# Patient Record
Sex: FEMALE | Race: WHITE | URBAN - METROPOLITAN AREA
[De-identification: names, ages, dates, MRNs, and addresses within clinical notes are randomized per-mention and may not be internally consistent; named-entity substitution may affect disease eponyms.]

---

## 2022-03-20 PROBLEM — G43.009 MIGRAINE WITHOUT AURA AND WITHOUT STATUS MIGRAINOSUS, NOT INTRACTABLE: Status: ACTIVE | Noted: 2021-11-18

## 2022-07-14 ENCOUNTER — TELEMEDICINE (OUTPATIENT)
Dept: NEUROLOGY | Age: 32
End: 2022-07-14

## 2022-07-14 ENCOUNTER — TELEPHONE (OUTPATIENT)
Dept: NEUROLOGY | Age: 32
End: 2022-07-14

## 2022-07-14 DIAGNOSIS — G43.709 CHRONIC MIGRAINE WITHOUT AURA WITHOUT STATUS MIGRAINOSUS, NOT INTRACTABLE: Primary | ICD-10-CM

## 2022-07-14 DIAGNOSIS — G37.9 DEMYELINATING DISEASE (HCC): ICD-10-CM

## 2022-07-14 PROCEDURE — 99214 OFFICE O/P EST MOD 30 MIN: CPT | Performed by: PSYCHIATRY & NEUROLOGY

## 2022-07-14 RX ORDER — SUMATRIPTAN 100 MG/1
100 TABLET, FILM COATED ORAL
Qty: 9 TABLET | Refills: 11 | Status: SHIPPED | OUTPATIENT
Start: 2022-07-14 | End: 2022-07-14

## 2022-07-14 RX ORDER — DEXTROAMPHETAMINE SACCHARATE, AMPHETAMINE ASPARTATE, DEXTROAMPHETAMINE SULFATE AND AMPHETAMINE SULFATE 2.5; 2.5; 2.5; 2.5 MG/1; MG/1; MG/1; MG/1
TABLET ORAL
Qty: 60 TABLET | Refills: 0 | Status: SHIPPED | OUTPATIENT
Start: 2022-07-14 | End: 2022-08-14

## 2022-07-14 RX ORDER — DEXTROAMPHETAMINE SACCHARATE, AMPHETAMINE ASPARTATE, DEXTROAMPHETAMINE SULFATE AND AMPHETAMINE SULFATE 2.5; 2.5; 2.5; 2.5 MG/1; MG/1; MG/1; MG/1
TABLET ORAL
Qty: 60 TABLET | Refills: 0 | Status: SHIPPED | OUTPATIENT
Start: 2022-09-14 | End: 2022-10-13 | Stop reason: SDUPTHER

## 2022-07-14 RX ORDER — DEXTROAMPHETAMINE SACCHARATE, AMPHETAMINE ASPARTATE, DEXTROAMPHETAMINE SULFATE AND AMPHETAMINE SULFATE 2.5; 2.5; 2.5; 2.5 MG/1; MG/1; MG/1; MG/1
TABLET ORAL
Qty: 60 TABLET | Refills: 0 | Status: SHIPPED | OUTPATIENT
Start: 2022-08-14 | End: 2022-09-14

## 2022-07-14 ASSESSMENT — ENCOUNTER SYMPTOMS
EYES NEGATIVE: 1
RESPIRATORY NEGATIVE: 1
GASTROINTESTINAL NEGATIVE: 1

## 2022-07-14 NOTE — PROGRESS NOTES
[address], [address]  [place], [address]  Phone: [phone] Fax [phone]  Dr. Randye Nyhan      I was at home While conducting this encounter. She and/ or her healthcare decision maker is aware that this patient-initialed Telehealth encounter is a billable service with coverage as determined by her insurance carrier. She is aware that she may receive a bill and has provided verbal consent to proceed: Yes    The Virtual visit was conducted via [address] Pursuant to the emergency declaration under the [facility] waiver authority and the [employer] Act, this Virtual  Visit was conducted to reduce the patient's risk of exposure to COVID-19 and provide continuity of care for an established patient. Services were provided through a video synchronous discussion virtually to substitute for in-person clinic visit. Due to this being a TeleHealth evaluation, many elements of the physical examination are unable to be assessed. Total Time:  minutes: 20-29 minutes        7/14/2022  Counts include 234 beds at the [facility]     Patient is referred by the following provider for consultation regarding as below:       I reviewed the available records and notes and have examined patient with the following findings:     Chief Complaint:  No chief complaint on file. HPI: This is a right handed 27 y.o.  female who unfortunately started having severe chronic debilitating headaches when she was in college. They progressively gotten worse they actually discontinued her birth control pills because they thought that might help and unfortunately she even got more worse. She has distinct triggers which include alcohol menstrual cycle whether 2 drinks of alcohol will cause and trigger headache.   She was getting about 14 to 15/month there photophobia phonophobia since change in smell nausea vomiting foot and blurred vision. I do concern for some the symptomatologies and central nervous system demyelinating disease we I did order an MRI of the brain but she was not contacted. She has been on Imitrex 50 mg we change it to the 100 mg which she likes better. She is tried in the past over-the-counter medication Excedrin Relpax Topamax and Ubrelvy and Nurtec. Imitrex 100 mg seems to work better than most.  We put her on Ajovy and Ajovy took her from 15 headaches a month to 4-5 headaches per month a dramatic improvement. We did start on the Doxy system but had to change over the telephone system due to break-up technical difficulties on her screen. On last evaluation she did complain of ADD-like symptoms difficulty focusing and concentrating easily distracted and very difficult getting back at task there is though interfering with her job. It is just very difficult to get back on task when she is distracted away. When she is on a task she is easily frustrated by things by difficulty staying on task. She has never had any central nervous system stimulants in the past.  She has no palpitation shortness of breath wheezing coughing. IMAGING REVIEW:  I REVIEWED PERTINENT  IMAGES AND REPORTS WITH THE PATIENT PERSONALLY, DIRECTLY AND FULLY. Past Medical History:  No past medical history on file. Past Surgical History:  No past surgical history on file.     Social History:  Social History     Socioeconomic History    Marital status: Single     Spouse name: Not on file    Number of children: Not on file    Years of education: Not on file    Highest education level: Not on file   Occupational History    Not on file   Tobacco Use    Smoking status: Not on file    Smokeless tobacco: Not on file   Substance and Sexual Activity    Alcohol use: Not on file    Drug use: Not on file    Sexual activity: Not on file   Other Topics Concern    Not on file   Social History Narrative    Not on file     Social Systems:  Review of Systems   Constitutional: Negative. HENT: Negative. Eyes: Negative. Respiratory: Negative. Cardiovascular: Negative. Gastrointestinal: Negative. Endocrine: Negative. Genitourinary: Negative. Musculoskeletal: Negative. Skin: Negative. Neurological: Positive for headaches. No flowsheet data found. No flowsheet data found. There were no vitals filed for this visit. Physical Exam  Constitutional:       Appearance: Normal appearance. HENT:      Head: Normocephalic and atraumatic. Eyes:      Extraocular Movements: Extraocular movements intact and EOM normal.   Neurological:      Mental Status: She is alert and oriented to person, place, and time. Neurologic Exam     Mental Status   Oriented to person, place, and time. Attention: normal. Concentration: normal.   Level of consciousness: alert  Knowledge: good. Her speech is clear and concise. Cranial Nerves     CN II   Visual fields full to confrontation. CN III, IV, VI   Extraocular motions are normal.     CN VII   Facial expression full, symmetric. Motor Exam   Right arm tone: normal  Left arm tone: normal          Assessment   Assessment / Plan:    Diagnoses and all orders for this visit:    Chronic migraine without aura without status migrainosus, not intractable her headaches went from having about 15/month to 4 to 5/month using Ajovy which is fantastic. Imitrex 100 mg worked up breaking the headaches Nurtec did not work as well. She was never contacted for the MRI of the brain and the MRI of the brain is more for demyelinating disease due to some of her symptomatology. At this time due to her ADD-like symptoms were to move forward using Adderall 10 mg 1 the morning and 1 midday I gave her 3 prescriptions. It is not a memory issue is definitely a focusing and concentration issue. This is another reason to be doing an MRI.   Its not uncommon for this to show up with MS.    Demyelinating disease ([facility] [address] this was a strong concern on last evaluation we proceeded with an MRI of the brain unfortunately she was never contacted. -     MRI BRAIN W WO CONTRAST; Future  -     amphetamine-dextroamphetamine (ADDERALL, 10MG,) 10 MG tablet; Take one in AM and noon  -     amphetamine-dextroamphetamine (ADDERALL, 10MG,) 10 MG tablet; Take one in AM and noon  -     amphetamine-dextroamphetamine (ADDERALL, 10MG,) 10 MG tablet; Take one in AM and noon  -     MRI BRAIN W WO CONTRAST; Future    Other orders  -     SUMAtriptan (IMITREX) 100 MG tablet; Take 1 tablet by mouth once as needed for Migraine        The Diagnosis and differential diagnostic considerations, and Rx Tx were reviewed with the patient at length. Orders Placed This Encounter   Procedures    MRI BRAIN W WO CONTRAST     Standing Status:   Future     Standing Expiration Date:   7/14/2023     Order Specific Question:   STAT Creatinine as needed:     Answer: Yes    MRI BRAIN W WO CONTRAST     Standing Status:   Future     Standing Expiration Date:   7/14/2023     Order Specific Question:   STAT Creatinine as needed:     Answer:   Yes          I have spent greater than 50% of visit discussing and counseling of patient  for treatment and diagnostic plan review. Total time 30 min     . Notes: Patient is to continue all medications as directed by prescribing physicians. Continuations on today's visit are made based on the patient's report of current medications.              Dr. Keri Pollack  Consultation Neurology, Neurodiagnostics and Neurotherapeutics  Neuroelectrophysiology, EEG, EMG  [facility]  [address] [place] [address], [address] [facility]  Phone:  [phone]  Fax:   [phone]

## 2022-08-10 ENCOUNTER — TELEPHONE (OUTPATIENT)
Dept: NEUROLOGY | Age: 32
End: 2022-08-10

## 2022-08-10 NOTE — TELEPHONE ENCOUNTER
Patient left message requesting refill of Adderall. Patient also requests increase in dose as discussed during her virtual visit on 7/14/22. Pharmacy up to date in chart. Per chart review . . patient has adderall 10mg refills for 8/14 and 9/14.

## 2022-08-11 NOTE — TELEPHONE ENCOUNTER
She has 2 more prescriptions so I am not going to refill it. She has 1 for this month and next month. We will not increase it until we see her in follow-up appointment. Let her know please I am not sure if he got the previous message I just sent since the computer is acting up. It will not show me what I sent.

## 2022-08-11 NOTE — TELEPHONE ENCOUNTER
She has 2 more prescriptions on not can refill it. And we will not increase it until I see her in follow-up appointment.  Please let her know

## 2022-08-12 NOTE — TELEPHONE ENCOUNTER
Call back received form patient and informed of Dr. Gonzalo Cowan response. Patient voiced understanding and agreement. Closing encounter.

## 2022-10-13 ENCOUNTER — TELEPHONE (OUTPATIENT)
Dept: NEUROLOGY | Age: 32
End: 2022-10-13

## 2022-10-13 DIAGNOSIS — G37.9 DEMYELINATING DISEASE (HCC): ICD-10-CM

## 2022-10-13 RX ORDER — DEXTROAMPHETAMINE SACCHARATE, AMPHETAMINE ASPARTATE, DEXTROAMPHETAMINE SULFATE AND AMPHETAMINE SULFATE 2.5; 2.5; 2.5; 2.5 MG/1; MG/1; MG/1; MG/1
TABLET ORAL
Qty: 60 TABLET | Refills: 0 | Status: SHIPPED | OUTPATIENT
Start: 2022-10-13 | End: 2022-11-12

## 2022-10-13 RX ORDER — FREMANEZUMAB-VFRM 225 MG/1.5ML
INJECTION SUBCUTANEOUS
Qty: 3 ADJUSTABLE DOSE PRE-FILLED PEN SYRINGE | Refills: 3 | Status: SHIPPED | OUTPATIENT
Start: 2022-10-13

## 2022-10-13 NOTE — TELEPHONE ENCOUNTER
Patient called to request refill of Ajovy and Adderall. Last seen:7/14/22  F/U: 11/8/22    Pharmacy up to date.

## 2022-11-08 ENCOUNTER — TELEMEDICINE (OUTPATIENT)
Dept: NEUROLOGY | Age: 32
End: 2022-11-08

## 2022-11-08 DIAGNOSIS — G37.9 DEMYELINATING DISEASE (HCC): ICD-10-CM

## 2022-11-08 DIAGNOSIS — G43.709 CHRONIC MIGRAINE WITHOUT AURA WITHOUT STATUS MIGRAINOSUS, NOT INTRACTABLE: Primary | ICD-10-CM

## 2022-11-08 DIAGNOSIS — F98.8 ATTENTION DEFICIT DISORDER, UNSPECIFIED HYPERACTIVITY PRESENCE: ICD-10-CM

## 2022-11-08 PROCEDURE — 99214 OFFICE O/P EST MOD 30 MIN: CPT | Performed by: PSYCHIATRY & NEUROLOGY

## 2022-11-08 RX ORDER — SUMATRIPTAN 100 MG/1
100 TABLET, FILM COATED ORAL
Qty: 9 TABLET | Refills: 11 | Status: SHIPPED | OUTPATIENT
Start: 2022-11-08 | End: 2022-11-08

## 2022-11-08 RX ORDER — FREMANEZUMAB-VFRM 225 MG/1.5ML
INJECTION SUBCUTANEOUS
Qty: 1 ADJUSTABLE DOSE PRE-FILLED PEN SYRINGE | Refills: 11 | Status: SHIPPED | OUTPATIENT
Start: 2022-11-08

## 2022-11-08 ASSESSMENT — ENCOUNTER SYMPTOMS
ALLERGIC/IMMUNOLOGIC NEGATIVE: 1
RESPIRATORY NEGATIVE: 1
GASTROINTESTINAL NEGATIVE: 1

## 2022-11-08 NOTE — PROGRESS NOTES
[address], [address]  [place], [address]  Phone: [phone] Fax [phone]  Dr. Laine Lozoya was in the office While conducting this encounter. She and/ or her healthcare decision maker is aware that this patient-initialed Telehealth encounter is a billable service with coverage as determined by her insurance carrier. She is aware that she may receive a bill and has provided verbal consent to proceed: Yes    The Virtual visit was conducted via [employer]. Pursuant to the emergency declaration under the [facility] waiver authority and the [employer] Act, this Virtual  Visit was conducted to reduce the patient's risk of exposure to COVID-19 and provide continuity of care for an established patient. Services were provided through a video synchronous discussion virtually to substitute for in-person clinic visit. Due to this being a TeleHealth evaluation, many elements of the physical examination are unable to be assessed. Total Time:  minutes: 30-39 minutes        11/8/2022  [place]     Patient is referred by the following provider for consultation regarding as below:       I reviewed the available records and notes and have examined patient with the following findings:     Chief Complaint:  No chief complaint on file. HPI: This is a right handed 27 y.o.  female who is very pleasant very appropriate patient who unfortunately started having severe chronic migraine headaches this started when she was in college. They progressively gotten worse. That she actually discontinued her birth control to try birth control pills to see if it would help. She already knows that her menstrual cycle and alcohol definitely trigger her headaches which is very frustrating to her.   We did have a lengthy talk and she is going to try organic wine and see if the elimination of sulfates helps with the headaches. She was getting up to 15/month with severe photophobia phonophobia nausea and vomiting. We had set her up for an MRI because we are concerned about the paresthesias which she gets both during the migraines and not as well as visual changes that she gets during the migraine headaches as well as 9 with the headaches. But the MRIs not been done yet. On top of this she gets paresthesias associated with the headaches occasionally. We put her on Ajovy and she went from 15 a month to 4 to 5/month which was a dramatic improvement. But now they have creeped back up to about 2 a week which is an increase. And were not sure if it is because we added Adderall 10 mg twice a day. She does have ADD-like symptoms has all her life focusing concentrating was very difficult easily distracted and difficulties getting back on task. The Adderall definitely helped her. With these problems but were not sure but it made her easily agitated easily frustrated as well she has had an increase in headaches and I am not sure if it is from the Adderall itself. IMAGING REVIEW:  I REVIEWED PERTINENT  IMAGES AND REPORTS WITH THE PATIENT PERSONALLY, DIRECTLY AND FULLY. Past Medical History:  No past medical history on file. Past Surgical History:  No past surgical history on file.     Social History:  Social History     Socioeconomic History    Marital status: Single     Spouse name: Not on file    Number of children: Not on file    Years of education: Not on file    Highest education level: Not on file   Occupational History    Not on file   Tobacco Use    Smoking status: Not on file    Smokeless tobacco: Not on file   Substance and Sexual Activity    Alcohol use: Not on file    Drug use: Not on file    Sexual activity: Not on file   Other Topics Concern    Not on file   Social History Narrative    Not on file     Social Determinants of Health     Financial Resource Strain: Not on file   Food Insecurity: Not on file   Transportation Needs: Not on file   Physical Activity: Not on file   Stress: Not on file   Social Connections: Not on file   Intimate Partner Violence: Not on file   Housing Stability: Not on file       Family History:   No family history on file. Current Outpatient Medications on File Prior to Visit   Medication Sig Dispense Refill    amphetamine-dextroamphetamine (ADDERALL, 10MG,) 10 MG tablet Take one in AM and noon 60 tablet 0    amphetamine-dextroamphetamine (ADDERALL, 10MG,) 10 MG tablet Take one in AM and noon 60 tablet 0    amphetamine-dextroamphetamine (ADDERALL, 10MG,) 10 MG tablet Take one in AM and noon 60 tablet 0    MAGNESIUM PO Take by mouth      eletriptan (RELPAX) 40 MG tablet [email or web address] of migraine, repeat in 2 hrs prn. Max 2 tabs/24 hrs. Do not use > 3 days/week. fluticasone (FLONASE) 50 MCG/ACT nasal spray 2 sprays by Nasal route daily      topiramate (TOPAMAX) 25 MG tablet TAKE 1 TAB BY MOUTH AT BEDTIME. MAY TAKE 1 TAB TWICE DAILY IN 3 TO 4 WEEKS AS NEEDED       No current facility-administered medications on file prior to visit. No Known Allergies    Review of Systems:  Review of Systems   Constitutional: Negative. HENT: Negative. Eyes:  Positive for visual disturbance. Respiratory: Negative. Cardiovascular: Negative. Gastrointestinal: Negative. Endocrine: Negative. Genitourinary: Negative. Musculoskeletal: Negative. Allergic/Immunologic: Negative. Neurological:  Positive for numbness and headaches. Hematological: Negative. Psychiatric/Behavioral: Negative. No flowsheet data found. No flowsheet data found. There were no vitals filed for this visit. Physical Exam  Constitutional:       Appearance: Normal appearance. HENT:      Head: Normocephalic and atraumatic. Eyes:      Extraocular Movements: Extraocular movements intact and EOM normal.   Abdominal:      General: Abdomen is flat. Neurological:      Mental Status: She is alert and oriented to person, place, and time. Neurologic Exam     Mental Status   Oriented to person, place, and time. Attention: normal. Concentration: normal.   Level of consciousness: alert  Knowledge: good. Speech is clear and concise     Cranial Nerves     CN III, IV, VI   Extraocular motions are normal.     CN VII   Facial expression full, symmetric. Motor Exam   Right arm tone: normal  Left arm tone: normal        Assessment   Assessment / Plan:    Diagnoses and all orders for this visit:    Chronic migraine without aura without status migrainosus, not intractable at this at this time the Ajovy is actually working. It took her from about 15 a month down to 4 to 5/month but now she is back up to about 2 a week which is a little bit of an increase. And were not sure if it is because of the Adderall. So she is going to hold the Adderall to see if her mood improves as well as the headaches improve and if they do we may consider trying Ritalin or Concerta. And she will let us know. Demyelinating disease ([facility] [address] due to the mood swings and paresthesias that occur and visual changes we need to move forward with an MRI of the patient's brain. We have been trying to set this MRI but unfortunately she just does not get called. -     MRI BRAIN W WO CONTRAST; Future    Attention deficit disorder, unspecified hyperactivity presence there is no question the Adderall helped her stay focused and productive but it did change her mood to more of a \"sad\". As well as made her extremely easily frustrated at least we think so. I will  hold the Adderall and if she does better we will get a changer to again either Ritalin or Concerta. She will send me a message or call. Other orders  -     Fremanezumab-vfrm (AJOVY) 225 MG/1.5ML SOAJ; Take one q month  -     SUMAtriptan (IMITREX) 100 MG tablet;  Take 1 tablet by mouth once as needed for Migraine      The Diagnosis and differential diagnostic considerations, and Rx Tx were reviewed with the patient at length. Orders Placed This Encounter   Procedures    MRI BRAIN W WO CONTRAST     Standing Status:   Future     Standing Expiration Date:   11/8/2023     Order Specific Question:   STAT Creatinine as needed:     Answer:   Yes          I have spent greater than 50% of visit discussing and counseling of patient  for treatment and diagnostic plan review. Total time30 min     . Notes: Patient is to continue all medications as directed by prescribing physicians. Continuations on today's visit are made based on the patient's report of current medications.              Dr. Aleyda Ramirez  Consultation Neurology, Neurodiagnostics and Neurotherapeutics  Neuroelectrophysiology, EEG, EMG  [facility]  [address] [place] [address], [address] [facility]  Phone:  [phone]  Fax:   [phone]

## 2022-11-16 ENCOUNTER — TELEPHONE (OUTPATIENT)
Dept: NEUROLOGY | Age: 32
End: 2022-11-16

## 2022-11-17 DIAGNOSIS — F98.8 ATTENTION DEFICIT DISORDER, UNSPECIFIED HYPERACTIVITY PRESENCE: Primary | ICD-10-CM

## 2022-11-17 RX ORDER — METHYLPHENIDATE HYDROCHLORIDE 10 MG/1
TABLET ORAL
Qty: 60 TABLET | Refills: 0 | Status: SHIPPED | OUTPATIENT
Start: 2022-11-17 | End: 2022-12-17

## 2022-11-17 NOTE — PROGRESS NOTES
She really wanted to try Ritalin I put her on 10 mg 1 the morning 1 midday and we will see how she does.

## 2022-12-13 ENCOUNTER — TELEPHONE (OUTPATIENT)
Dept: NEUROLOGY | Age: 32
End: 2022-12-13

## 2022-12-13 NOTE — TELEPHONE ENCOUNTER
----- Message from Yasmin Oakley DO sent at 11/8/2022 12:41 PM EST -----  Regarding: fu  She will need a fu apt in 4-5 mths

## 2023-01-23 ENCOUNTER — OFFICE VISIT (OUTPATIENT)
Dept: NEUROLOGY | Age: 33
End: 2023-01-23
Payer: COMMERCIAL

## 2023-01-23 ENCOUNTER — PATIENT MESSAGE (OUTPATIENT)
Dept: NEUROLOGY | Age: 33
End: 2023-01-23

## 2023-01-23 VITALS
SYSTOLIC BLOOD PRESSURE: 124 MMHG | BODY MASS INDEX: 23.8 KG/M2 | DIASTOLIC BLOOD PRESSURE: 83 MMHG | WEIGHT: 170 LBS | HEIGHT: 71 IN

## 2023-01-23 DIAGNOSIS — F98.8 ATTENTION DEFICIT DISORDER, UNSPECIFIED HYPERACTIVITY PRESENCE: ICD-10-CM

## 2023-01-23 DIAGNOSIS — F98.8 ATTENTION DEFICIT DISORDER, UNSPECIFIED HYPERACTIVITY PRESENCE: Primary | ICD-10-CM

## 2023-01-23 DIAGNOSIS — G43.709 CHRONIC MIGRAINE WITHOUT AURA WITHOUT STATUS MIGRAINOSUS, NOT INTRACTABLE: ICD-10-CM

## 2023-01-23 PROCEDURE — 99214 OFFICE O/P EST MOD 30 MIN: CPT | Performed by: PSYCHIATRY & NEUROLOGY

## 2023-01-23 RX ORDER — METHYLPHENIDATE HYDROCHLORIDE 20 MG/1
TABLET ORAL
Qty: 60 TABLET | Refills: 0 | Status: SHIPPED | OUTPATIENT
Start: 2023-04-21 | End: 2023-01-25 | Stop reason: SDUPTHER

## 2023-01-23 RX ORDER — METHYLPHENIDATE HYDROCHLORIDE 20 MG/1
TABLET ORAL
Qty: 60 TABLET | Refills: 0 | Status: SHIPPED | OUTPATIENT
Start: 2023-01-23 | End: 2023-01-25

## 2023-01-23 RX ORDER — METHYLPHENIDATE HYDROCHLORIDE 20 MG/1
TABLET ORAL
Qty: 60 TABLET | Refills: 0 | Status: SHIPPED | OUTPATIENT
Start: 2023-02-22 | End: 2023-03-22

## 2023-01-23 RX ORDER — FREMANEZUMAB-VFRM 225 MG/1.5ML
INJECTION SUBCUTANEOUS
Qty: 1 ADJUSTABLE DOSE PRE-FILLED PEN SYRINGE | Refills: 11 | Status: SHIPPED | OUTPATIENT
Start: 2023-01-23

## 2023-01-23 RX ORDER — SUMATRIPTAN 100 MG/1
100 TABLET, FILM COATED ORAL
Qty: 9 TABLET | Refills: 11 | Status: SHIPPED | OUTPATIENT
Start: 2023-01-23 | End: 2023-01-23

## 2023-01-23 ASSESSMENT — ENCOUNTER SYMPTOMS
EYES NEGATIVE: 1
ALLERGIC/IMMUNOLOGIC NEGATIVE: 1
RESPIRATORY NEGATIVE: 1

## 2023-01-23 NOTE — PROGRESS NOTES
[address], [address] [place] [address], [address] [place] [address]  Phone: [phone] Fax [phone]  Dr. Su Campuzano      1/23/2023  Primus Pu     Patient is referred by the following provider for consultation regarding as below:       I reviewed the available records and notes and have examined patient with the following findings:     Chief Complaint:  Chief Complaint   Patient presents with    Migraine          HPI: This is a right handed 34 y.o. Single female who is very pleasant very appropriate patient unfortunately suffers from chronic migraine headaches that started when she was in college. She discontinued her birth control pills but there was no change to her headaches. She definitely knows one of her triggers as well nonorganic wine were not sure about organic wine yet. Along with her menstrual cycles are definite triggers if she has 3 glasses of wine and she is going to have a migraine headache. She typically was getting about 15/month without that is her baseline. Severe pressure severe photophobia nausea phonophobia and vomiting associated with. There is visual changes paresthetic sensations and paresthesias throughout her body and throughout her head when she gets these. We have been trying to get an MRI of her brain for central nervous system demyelinating disease because of these paresthesias that come. Both with and without her headaches. Giacomodamionflavio took her from 13 a month down to 4-month. And Imitrex 100 mg works remarkably well. She she does occasionally get visual changes with or without her headaches which also makes me concerned for demyelinating disease. She has ADD-like symptoms. Focus and concentration easily distractible difficulties getting back on task Adderall did cause a lot of mood changes so we tried her on Ritalin 10 mg 1 in the morning 1 midday which is better than the Adderall in regards to the mood but she needs a higher dose.   Her  aware program was looked into it as well. She is already tried over-the-counter medications Relpax Topamax Imitrex Excedrin Ubrelvy Nurtec for her headaches. IMAGING REVIEW:  I REVIEWED PERTINENT  IMAGES AND REPORTS WITH THE PATIENT PERSONALLY, DIRECTLY AND FULLY. Past Medical History:  No past medical history on file. Past Surgical History:  No past surgical history on file. Social History:  Social History     Socioeconomic History    Marital status: Single     Spouse name: Not on file    Number of children: Not on file    Years of education: Not on file    Highest education level: Not on file   Occupational History    Not on file   Tobacco Use    Smoking status: Former     Types: Cigarettes    Smokeless tobacco: Never   Substance and Sexual Activity    Alcohol use: Not on file    Drug use: Not on file    Sexual activity: Not on file   Other Topics Concern    Not on file   Social History Narrative    Not on file     Social Determinants of Health     Financial Resource Strain: Not on file   Food Insecurity: Not on file   Transportation Needs: Not on file   Physical Activity: Not on file   Stress: Not on file   Social Connections: Not on file   Intimate Partner Violence: Not on file   Housing Stability: Not on file       Family History:   No family history on file. Current Outpatient Medications on File Prior to Visit   Medication Sig Dispense Refill    MAGNESIUM PO Take by mouth prn      amphetamine-dextroamphetamine (ADDERALL, 10MG,) 10 MG tablet Take one in AM and noon 60 tablet 0    amphetamine-dextroamphetamine (ADDERALL, 10MG,) 10 MG tablet Take one in AM and noon 60 tablet 0    amphetamine-dextroamphetamine (ADDERALL, 10MG,) 10 MG tablet Take one in AM and noon 60 tablet 0    eletriptan (RELPAX) 40 MG tablet [email or web address] of migraine, repeat in 2 hrs prn. Max 2 tabs/24 hrs. Do not use > 3 days/week.  (Patient not taking: Reported on 1/23/2023)      fluticasone (FLONASE) 50 MCG/ACT nasal spray 2 sprays by Nasal route daily      topiramate (TOPAMAX) 25 MG tablet TAKE 1 TAB BY MOUTH AT BEDTIME. MAY TAKE 1 TAB TWICE DAILY IN 3 TO 4 WEEKS AS NEEDED (Patient not taking: Reported on 1/23/2023)       No current facility-administered medications on file prior to visit. No Known Allergies    Review of Systems:  Review of Systems   Constitutional: Negative. HENT: Negative. Eyes: Negative. Respiratory: Negative. Cardiovascular: Negative. Endocrine: Negative. Genitourinary: Negative. Allergic/Immunologic: Negative. Neurological:  Positive for numbness and headaches. Hematological: Negative. No flowsheet data found. No flowsheet data found. Vitals:    01/23/23 1223   BP: 124/83   Weight: 170 lb (77.1 kg)   Height: 5' 11\" (1.803 m)        Physical Exam  Constitutional:       Appearance: Normal appearance. HENT:      Head: Normocephalic and atraumatic. Eyes:      Extraocular Movements: Extraocular movements intact and EOM normal.      Pupils: Pupils are equal, round, and reactive to light. Cardiovascular:      Rate and Rhythm: Normal rate and regular rhythm. Pulses: Normal pulses. Pulmonary:      Effort: Pulmonary effort is normal.   Abdominal:      General: Abdomen is flat. Neurological:      Mental Status: She is alert and oriented to person, place, and time. Gait: Gait is intact. Deep Tendon Reflexes:      Reflex Scores:       Tricep reflexes are 2+ on the right side and 2+ on the left side. Bicep reflexes are 2+ on the right side and 2+ on the left side. Brachioradialis reflexes are 2+ on the right side and 2+ on the left side. Patellar reflexes are 2+ on the right side and 2+ on the left side. Achilles reflexes are 2+ on the right side and 2+ on the left side. Neurologic Exam     Mental Status   Oriented to person, place, and time. Attention: normal. Concentration: normal.   Level of consciousness: alert  Knowledge: good. Cranial Nerves     CN II   Visual fields full to confrontation. CN III, IV, VI   Pupils are equal, round, and reactive to light. Extraocular motions are normal.     CN VII   Facial expression full, symmetric. Motor Exam   Right arm tone: normal  Left arm tone: normal  Right leg tone: normal  Left leg tone: normal    Gait, Coordination, and Reflexes     Gait  Gait: normal    Tremor   Resting tremor: absent  Intention tremor: absent  Action tremor: absent    Reflexes   Right brachioradialis: 2+  Left brachioradialis: 2+  Right biceps: 2+  Left biceps: 2+  Right triceps: 2+  Left triceps: 2+  Right patellar: 2+  Left patellar: 2+  Right achilles: 2+  Left achilles: 2+        Assessment   Assessment / Plan:    Vimal Phillips was seen today for migraine. Diagnoses and all orders for this visit:    Attention deficit disorder, unspecified hyperactivity presence the Ritalin 10 mg 1 morning 1 midday is good but it does drop off rather quickly and she would like to try higher dose. The Ritalin causes less moodiness then Adderall at this time. -     methylphenidate (RITALIN) 20 MG tablet; Take one in Am and noon  -     methylphenidate (RITALIN) 20 MG tablet; Take one in AM and noon  -     methylphenidate (RITALIN) 20 MG tablet; Take one in AM and noon    Chronic migraine without aura without status migrainosus, not intractable she does extremely well with Ajovy along with Imitrex 100 mg tablets she is down to 3 maybe 4 headaches a month which the Imitrex works great for her. She does not want to change it at this time she is tried a multitude of other medications as mentioned above. Other orders  -     SUMAtriptan (IMITREX) 100 MG tablet; Take 1 tablet by mouth once as needed for Migraine  -     Fremanezumab-vfrm (AJOVY) 225 MG/1.5ML SOAJ; Take one q month        The Diagnosis and differential diagnostic considerations, and Rx Tx were reviewed with the patient at length.            No orders of the defined types were placed in this encounter. I have spent greater than 50% of visit discussing and counseling of patient  for treatment and diagnostic plan review. Total time30 min     . Notes: Patient is to continue all medications as directed by prescribing physicians. Continuations on today's visit are made based on the patient's report of current medications.              Dr. Reyes Landin  Consultation Neurology, Neurodiagnostics and Neurotherapeutics  Neuroelectrophysiology, EEG, EMG  [facility]  [address] [place] [address], [address] [place] [address]  Phone:  [phone]  Fax:   [phone]

## 2023-01-25 RX ORDER — METHYLPHENIDATE HYDROCHLORIDE 20 MG/1
TABLET ORAL
Qty: 60 TABLET | Refills: 0 | Status: SHIPPED | OUTPATIENT
Start: 2023-04-21 | End: 2023-05-22

## 2023-01-25 NOTE — TELEPHONE ENCOUNTER
Patient would like medication sent to [facility]. Medication pended with correct pharmacy attached.

## 2023-03-01 DIAGNOSIS — G37.9 DEMYELINATING DISEASE (HCC): ICD-10-CM

## 2023-03-01 RX ORDER — DEXTROAMPHETAMINE SACCHARATE, AMPHETAMINE ASPARTATE, DEXTROAMPHETAMINE SULFATE AND AMPHETAMINE SULFATE 2.5; 2.5; 2.5; 2.5 MG/1; MG/1; MG/1; MG/1
TABLET ORAL
Qty: 60 TABLET | Refills: 0 | Status: SHIPPED | OUTPATIENT
Start: 2023-03-01 | End: 2023-06-26

## 2023-03-01 RX ORDER — DEXTROAMPHETAMINE SACCHARATE, AMPHETAMINE ASPARTATE, DEXTROAMPHETAMINE SULFATE AND AMPHETAMINE SULFATE 2.5; 2.5; 2.5; 2.5 MG/1; MG/1; MG/1; MG/1
TABLET ORAL
Qty: 60 TABLET | Refills: 0 | Status: SHIPPED | OUTPATIENT
Start: 2023-03-01 | End: 2023-03-31

## 2023-03-01 NOTE — TELEPHONE ENCOUNTER
----- Message from Brad Vidal sent at 3/1/2023  9:40 AM EST -----  Regarding: Ritalin Refill  Staff,    I had my Ritalin refilled after my last visit with Dr. Suleiman Frank and it is supposed to refill every 15th of the month. Right now, I see my next refill is April. Did something change? Is it possible to have the refills automatically done every 15th like we were doing before please? Please advise, thank you in advance.      Lino Price

## 2023-05-02 DIAGNOSIS — F98.8 ATTENTION DEFICIT DISORDER, UNSPECIFIED HYPERACTIVITY PRESENCE: ICD-10-CM

## 2023-05-03 RX ORDER — METHYLPHENIDATE HYDROCHLORIDE 20 MG/1
TABLET ORAL
Qty: 60 TABLET | Refills: 0 | Status: SHIPPED | OUTPATIENT
Start: 2023-05-03 | End: 2023-06-02

## 2023-05-17 RX ORDER — FREMANEZUMAB-VFRM 225 MG/1.5ML
INJECTION SUBCUTANEOUS
Qty: 1 ADJUSTABLE DOSE PRE-FILLED PEN SYRINGE | Refills: 11 | Status: SHIPPED | OUTPATIENT
Start: 2023-05-17

## 2023-06-20 DIAGNOSIS — F98.8 ATTENTION DEFICIT DISORDER, UNSPECIFIED HYPERACTIVITY PRESENCE: Primary | ICD-10-CM

## 2023-06-20 RX ORDER — METHYLPHENIDATE HYDROCHLORIDE 20 MG/1
TABLET ORAL
Qty: 60 TABLET | Refills: 0 | Status: SHIPPED | OUTPATIENT
Start: 2023-06-20 | End: 2023-07-20

## 2023-07-17 RX ORDER — SUMATRIPTAN 100 MG/1
TABLET, FILM COATED ORAL
Qty: 9 TABLET | Refills: 11 | Status: SHIPPED | OUTPATIENT
Start: 2023-07-17

## 2023-07-17 RX ORDER — FREMANEZUMAB-VFRM 225 MG/1.5ML
INJECTION SUBCUTANEOUS
Qty: 1 ADJUSTABLE DOSE PRE-FILLED PEN SYRINGE | Refills: 11 | Status: SHIPPED | OUTPATIENT
Start: 2023-07-17

## 2023-07-24 ENCOUNTER — OFFICE VISIT (OUTPATIENT)
Dept: NEUROLOGY | Age: 33
End: 2023-07-24
Payer: COMMERCIAL

## 2023-07-24 DIAGNOSIS — S16.1XXD STRAIN OF NECK MUSCLE, SUBSEQUENT ENCOUNTER: ICD-10-CM

## 2023-07-24 DIAGNOSIS — G43.709 CHRONIC MIGRAINE WITHOUT AURA WITHOUT STATUS MIGRAINOSUS, NOT INTRACTABLE: Primary | ICD-10-CM

## 2023-07-24 DIAGNOSIS — F98.8 ATTENTION DEFICIT DISORDER, UNSPECIFIED HYPERACTIVITY PRESENCE: ICD-10-CM

## 2023-07-24 PROCEDURE — 99214 OFFICE O/P EST MOD 30 MIN: CPT | Performed by: PSYCHIATRY & NEUROLOGY

## 2023-07-24 RX ORDER — METHYLPHENIDATE HYDROCHLORIDE 20 MG/1
TABLET ORAL
Qty: 90 TABLET | Refills: 0 | Status: SHIPPED | OUTPATIENT
Start: 2023-09-22 | End: 2023-10-22

## 2023-07-24 RX ORDER — TIZANIDINE 4 MG/1
4 TABLET ORAL EVERY 8 HOURS PRN
Qty: 20 TABLET | Refills: 1 | Status: SHIPPED | OUTPATIENT
Start: 2023-07-24

## 2023-07-24 RX ORDER — METHYLPHENIDATE HYDROCHLORIDE 20 MG/1
TABLET ORAL
Qty: 90 TABLET | Refills: 0 | Status: SHIPPED | OUTPATIENT
Start: 2023-07-24 | End: 2023-08-23

## 2023-07-24 RX ORDER — SUMATRIPTAN 100 MG/1
TABLET, FILM COATED ORAL
Qty: 9 TABLET | Refills: 11 | Status: SHIPPED | OUTPATIENT
Start: 2023-07-24

## 2023-07-24 RX ORDER — METHYLPHENIDATE HYDROCHLORIDE 20 MG/1
TABLET ORAL
Qty: 90 TABLET | Refills: 0 | Status: SHIPPED | OUTPATIENT
Start: 2023-08-23 | End: 2023-09-22

## 2023-07-24 RX ORDER — FREMANEZUMAB-VFRM 225 MG/1.5ML
INJECTION SUBCUTANEOUS
Qty: 1 ADJUSTABLE DOSE PRE-FILLED PEN SYRINGE | Refills: 11 | Status: SHIPPED | OUTPATIENT
Start: 2023-07-24

## 2023-07-24 RX ORDER — PREDNISONE 20 MG/1
TABLET ORAL
Qty: 12 TABLET | Refills: 0 | Status: SHIPPED | OUTPATIENT
Start: 2023-07-24

## 2023-07-24 ASSESSMENT — ENCOUNTER SYMPTOMS
EYES NEGATIVE: 1
ALLERGIC/IMMUNOLOGIC NEGATIVE: 1
GASTROINTESTINAL NEGATIVE: 1
RESPIRATORY NEGATIVE: 1

## 2023-07-24 NOTE — PROGRESS NOTES
children: Not on file    Years of education: Not on file    Highest education level: Not on file   Occupational History    Not on file   Tobacco Use    Smoking status: Former     Types: Cigarettes    Smokeless tobacco: Never   Substance and Sexual Activity    Alcohol use: Not on file    Drug use: Not on file    Sexual activity: Not on file   Other Topics Concern    Not on file   Social History Narrative    Not on file     Social Determinants of Health     Financial Resource Strain: Not on file   Food Insecurity: Not on file   Transportation Needs: Not on file   Physical Activity: Not on file   Stress: Not on file   Social Connections: Not on file   Intimate Partner Violence: Not on file   Housing Stability: Not on file       Family History:   No family history on file. Current Outpatient Medications on File Prior to Visit   Medication Sig Dispense Refill    amphetamine-dextroamphetamine (ADDERALL, 10MG,) 10 MG tablet Take one in AM and noon 60 tablet 0    amphetamine-dextroamphetamine (ADDERALL, 10MG,) 10 MG tablet Take one in AM and noon 60 tablet 0    amphetamine-dextroamphetamine (ADDERALL, 10MG,) 10 MG tablet Take one in AM and noon 60 tablet 0    MAGNESIUM PO Take by mouth prn      eletriptan (RELPAX) 40 MG tablet [email or web address] of migraine, repeat in 2 hrs prn. Max 2 tabs/24 hrs. Do not use > 3 days/week. (Patient not taking: Reported on 1/23/2023)      fluticasone (FLONASE) 50 MCG/ACT nasal spray 2 sprays by Nasal route daily      topiramate (TOPAMAX) 25 MG tablet TAKE 1 TAB BY MOUTH AT BEDTIME. MAY TAKE 1 TAB TWICE DAILY IN 3 TO 4 WEEKS AS NEEDED (Patient not taking: Reported on 1/23/2023)       No current facility-administered medications on file prior to visit. No Known Allergies    Review of Systems:  Review of Systems   Constitutional: Negative. HENT: Negative. Eyes: Negative. Respiratory: Negative. Cardiovascular: Negative. Gastrointestinal: Negative. Endocrine: Negative.

## 2023-08-23 DIAGNOSIS — F98.8 ATTENTION DEFICIT DISORDER, UNSPECIFIED HYPERACTIVITY PRESENCE: ICD-10-CM

## 2023-08-25 RX ORDER — METHYLPHENIDATE HYDROCHLORIDE 20 MG/1
TABLET ORAL
Qty: 90 TABLET | Refills: 0 | Status: SHIPPED | OUTPATIENT
Start: 2023-08-25 | End: 2023-09-22

## 2023-09-30 RX ORDER — SUMATRIPTAN 100 MG/1
TABLET, FILM COATED ORAL
Qty: 9 TABLET | Refills: 11 | Status: SHIPPED | OUTPATIENT
Start: 2023-09-30

## 2023-09-30 RX ORDER — FREMANEZUMAB-VFRM 225 MG/1.5ML
INJECTION SUBCUTANEOUS
Qty: 1 ADJUSTABLE DOSE PRE-FILLED PEN SYRINGE | Refills: 11 | Status: SHIPPED | OUTPATIENT
Start: 2023-09-30

## 2023-10-03 DIAGNOSIS — F98.8 ATTENTION DEFICIT DISORDER, UNSPECIFIED HYPERACTIVITY PRESENCE: ICD-10-CM

## 2023-10-03 RX ORDER — METHYLPHENIDATE HYDROCHLORIDE 20 MG/1
TABLET ORAL
Qty: 90 TABLET | Refills: 0 | Status: SHIPPED | OUTPATIENT
Start: 2023-10-03 | End: 2023-11-02

## 2023-10-09 ENCOUNTER — CLINICAL DOCUMENTATION (OUTPATIENT)
Dept: NEUROLOGY | Age: 33
End: 2023-10-09

## 2023-10-09 DIAGNOSIS — G37.9 DEMYELINATING DISEASE (HCC): Primary | ICD-10-CM

## 2023-10-09 NOTE — PROGRESS NOTES
The MRI we ordered in November 2022 she never got done so organ to reorder that MRI looking for central nervous system demyelinating disease.

## 2023-10-30 ENCOUNTER — TELEPHONE (OUTPATIENT)
Dept: NEUROLOGY | Age: 33
End: 2023-10-30

## 2023-10-30 NOTE — TELEPHONE ENCOUNTER
[facility] called and said that this pt has an MRI BRAIN scheduled Tues Oct 31st  and the authorization is pending denial due to the medically necessary criteria.   To do a [id number] call, you can call [phone], case number: [id number]

## 2023-11-02 ENCOUNTER — TELEPHONE (OUTPATIENT)
Dept: NEUROLOGY | Age: 33
End: 2023-11-02

## 2023-11-02 NOTE — TELEPHONE ENCOUNTER
Her insurance does not require prior authorization for Rexulti. It is a  formulary drug and will process at the discounted rate. Patient assistance information for medicare provided to the patient.

## 2023-11-06 ENCOUNTER — OFFICE VISIT (OUTPATIENT)
Dept: ORTHOPEDIC SURGERY | Age: 33
End: 2023-11-06

## 2023-11-06 DIAGNOSIS — M25.859 FEMORAL ACETABULAR IMPINGEMENT: Primary | ICD-10-CM

## 2023-11-06 DIAGNOSIS — M25.552 BILATERAL HIP PAIN: ICD-10-CM

## 2023-11-06 DIAGNOSIS — M25.551 BILATERAL HIP PAIN: ICD-10-CM

## 2023-11-06 NOTE — PROGRESS NOTES
Name: Thomas Moreno  YOB: 1990  Gender: female  MRN: [id number]      CC: Hip Pain (B)       HPI: Thomas Moreno is a 34 y.o. female who presents with Hip Pain (B)  . She is a very active individual who has been in sports or intense activity her whole life. She reports that she had a stress fracture in her hip back in 2019 while living in [place]. In the last few months she has felt some \"tweaks\" in her left hip after intense activity. Her most recent flare up was from a couple months ago while her and her  were putting up a horse fence. Her pain is deep in her groin near the femoral neck. Her right hip has never been officially check out, but she does have similar pain like her left every now and then. Describes pain mostly in the left side with the right side painful as well she describes as deep in her groin muscle. ROS/Meds/PSH/PMH/FH/SH: I personally reviewed the patients standard intake form. Below are the pertinents    Tobacco:  reports that she has quit smoking. Her smoking use included cigarettes. She has never used smokeless tobacco.  Diabetes: none  Other: Migraine headaches    Physical Examination:  General: no acute distress  Lungs: breathing easily  CV: regular rhythm by pulse  Right Hip and Left Hip: Hip exams are similar with the left being more painful than the right. She does have some mild tenderness palpation over the abductor tessa and posteriorly and there. She has mild discomfort with logroll. Flexion to about 110 bilaterally internal rotation about 15 on the left 5-10 on the right with some discomfort with FADIR bilaterally left worse than right. Nontender over the pubic symphysis of the rectus. External rotation about 40 degrees with negative Georgeana Boron. Mild discomfort with Stinchfield. Negative straight leg raise. Imaging:   Hip/pelvis XR: FRANCINE 4 views     Clinical Indication  1. Femoral acetabular impingement    2.  Bilateral hip pain           Report: AP,

## 2023-11-20 RX ORDER — FREMANEZUMAB-VFRM 225 MG/1.5ML
INJECTION SUBCUTANEOUS
Qty: 1 ADJUSTABLE DOSE PRE-FILLED PEN SYRINGE | Refills: 11 | Status: SHIPPED | OUTPATIENT
Start: 2023-11-20

## 2023-11-20 RX ORDER — SUMATRIPTAN 100 MG/1
TABLET, FILM COATED ORAL
Qty: 9 TABLET | Refills: 11 | Status: SHIPPED | OUTPATIENT
Start: 2023-11-20

## 2023-11-20 NOTE — TELEPHONE ENCOUNTER
Refills have been requested for the following medications:         Fremanezumab-vfrm (AJOVY) 225 MG/1.5ML SOAJ         SUMAtriptan (IMITREX) 100 MG tablet      Preferred pharmacy: [address] [place] [address]

## 2023-12-04 ENCOUNTER — TELEPHONE (OUTPATIENT)
Dept: NEUROLOGY | Age: 33
End: 2023-12-04

## 2023-12-05 ENCOUNTER — TELEPHONE (OUTPATIENT)
Dept: NEUROLOGY | Age: 33
End: 2023-12-05

## 2023-12-05 RX ORDER — FREMANEZUMAB-VFRM 225 MG/1.5ML
INJECTION SUBCUTANEOUS
Qty: 1 ADJUSTABLE DOSE PRE-FILLED PEN SYRINGE | Refills: 11 | Status: SHIPPED | OUTPATIENT
Start: 2023-12-05

## 2023-12-05 NOTE — TELEPHONE ENCOUNTER
Pt requests refill of Outpatient Order Fremanezumab-vfrm (AJOVY) 225 MG/1.5ML SOAJ to be sent to     [address], [address]

## 2023-12-12 RX ORDER — FREMANEZUMAB-VFRM 225 MG/1.5ML
INJECTION SUBCUTANEOUS
Qty: 1 ADJUSTABLE DOSE PRE-FILLED PEN SYRINGE | Refills: 11 | Status: SHIPPED | OUTPATIENT
Start: 2023-12-12

## 2023-12-12 RX ORDER — SUMATRIPTAN 100 MG/1
TABLET, FILM COATED ORAL
Qty: 9 TABLET | Refills: 11 | Status: SHIPPED | OUTPATIENT
Start: 2023-12-12

## 2023-12-12 NOTE — TELEPHONE ENCOUNTER
Refills have been requested for the following medications:         SUMAtriptan (IMITREX) 100 MG tablet [Dr. Guera Owen, DO]         Fremanezumab-vfrm (AJOVY) 225 MG/1.5ML SOAJ [Dr. Guera Owen, DO]     Preferred pharmacy: [facility] [address]

## 2024-01-04 DIAGNOSIS — G37.9 DEMYELINATING DISEASE (HCC): ICD-10-CM

## 2024-01-04 RX ORDER — DEXTROAMPHETAMINE SACCHARATE, AMPHETAMINE ASPARTATE, DEXTROAMPHETAMINE SULFATE AND AMPHETAMINE SULFATE 2.5; 2.5; 2.5; 2.5 MG/1; MG/1; MG/1; MG/1
TABLET ORAL
Qty: 60 TABLET | Refills: 0 | Status: SHIPPED | OUTPATIENT
Start: 2024-01-04 | End: 2024-03-30

## 2024-01-04 NOTE — TELEPHONE ENCOUNTER
Reason for call: Prescription refill    Medication: Adderall 10 MG Tab    Pharmacy: [facility]    Last visit: 7/24/23    Next visit: 2/26/24    Contact information: [phone]

## 2024-01-09 RX ORDER — FREMANEZUMAB-VFRM 225 MG/1.5ML
INJECTION SUBCUTANEOUS
Qty: 1 ADJUSTABLE DOSE PRE-FILLED PEN SYRINGE | Refills: 11 | Status: SHIPPED | OUTPATIENT
Start: 2024-01-09

## 2024-01-09 RX ORDER — SUMATRIPTAN 100 MG/1
TABLET, FILM COATED ORAL
Qty: 9 TABLET | Refills: 11 | Status: SHIPPED | OUTPATIENT
Start: 2024-01-09

## 2024-01-18 ENCOUNTER — TELEPHONE (OUTPATIENT)
Dept: NEUROLOGY | Age: 34
End: 2024-01-18

## 2024-01-18 DIAGNOSIS — G35 MS (MULTIPLE SCLEROSIS) (HCC): Primary | ICD-10-CM

## 2024-01-18 RX ORDER — METHYLPHENIDATE HYDROCHLORIDE 20 MG/1
TABLET ORAL
Qty: 90 TABLET | Refills: 0 | Status: SHIPPED | OUTPATIENT
Start: 2024-01-18 | End: 2024-02-18

## 2024-01-18 NOTE — TELEPHONE ENCOUNTER
Pt requests refill of Ritalin (30mg) daily, please send the prescription to the [facility] in [place], [place] off [facility].

## 2024-01-31 RX ORDER — SUMATRIPTAN 100 MG/1
TABLET, FILM COATED ORAL
Qty: 9 TABLET | Refills: 11 | Status: SHIPPED | OUTPATIENT
Start: 2024-01-31

## 2024-01-31 RX ORDER — FREMANEZUMAB-VFRM 225 MG/1.5ML
INJECTION SUBCUTANEOUS
Qty: 1 ADJUSTABLE DOSE PRE-FILLED PEN SYRINGE | Refills: 11 | Status: SHIPPED | OUTPATIENT
Start: 2024-01-31

## 2024-01-31 NOTE — TELEPHONE ENCOUNTER
Refills have been requested for the following medications:         SUMAtriptan (IMITREX) 100 MG tablet [Dr. Gerardo Erickson, DO]         Fremanezumab-vfrm (AJOVY) 225 MG/1.5ML SOAJ [Dr. Gerardo Erickson, DO]     Preferred pharmacy: [id number]/[address] - P [phone] - F [phone]

## 2024-02-29 RX ORDER — SUMATRIPTAN 100 MG/1
TABLET, FILM COATED ORAL
Qty: 9 TABLET | Refills: 11 | Status: SHIPPED | OUTPATIENT
Start: 2024-02-29

## 2024-02-29 RX ORDER — FREMANEZUMAB-VFRM 225 MG/1.5ML
INJECTION SUBCUTANEOUS
Qty: 1 ADJUSTABLE DOSE PRE-FILLED PEN SYRINGE | Refills: 11 | Status: SHIPPED | OUTPATIENT
Start: 2024-02-29

## 2024-02-29 NOTE — TELEPHONE ENCOUNTER
Pt requests a refill of Ritalin 30mg to be sent to: [id number] [place] - [address]  - P [phone] - F [phone]

## 2024-03-08 ENCOUNTER — TELEPHONE (OUTPATIENT)
Dept: NEUROLOGY | Age: 34
End: 2024-03-08

## 2024-03-08 DIAGNOSIS — F98.8 ATTENTION DEFICIT DISORDER, UNSPECIFIED HYPERACTIVITY PRESENCE: Primary | ICD-10-CM

## 2024-03-08 RX ORDER — METHYLPHENIDATE HYDROCHLORIDE 10 MG/1
10 TABLET ORAL 2 TIMES DAILY
Qty: 60 TABLET | Refills: 0 | Status: SHIPPED | OUTPATIENT
Start: 2024-03-08 | End: 2024-04-07

## 2024-04-23 RX ORDER — SUMATRIPTAN 100 MG/1
TABLET, FILM COATED ORAL
Qty: 9 TABLET | Refills: 11 | Status: SHIPPED | OUTPATIENT
Start: 2024-04-23

## 2024-04-23 RX ORDER — FREMANEZUMAB-VFRM 225 MG/1.5ML
INJECTION SUBCUTANEOUS
Qty: 1 ADJUSTABLE DOSE PRE-FILLED PEN SYRINGE | Refills: 11 | Status: SHIPPED | OUTPATIENT
Start: 2024-04-23

## 2024-04-23 NOTE — TELEPHONE ENCOUNTER
Comments: Refills have been requested for the following medications:         SUMAtriptan (IMITREX) 100 MG tablet         Fremanezumab-vfrm (AJOVY) 225 MG/1.5ML SOAJ     Preferred pharmacy: [id number] [address] [place] [address] - P [phone] - F [phone]

## 2024-05-20 RX ORDER — SUMATRIPTAN 100 MG/1
TABLET, FILM COATED ORAL
Qty: 9 TABLET | Refills: 11 | OUTPATIENT
Start: 2024-05-20

## 2024-05-20 RX ORDER — FREMANEZUMAB-VFRM 225 MG/1.5ML
INJECTION SUBCUTANEOUS
Qty: 1 ADJUSTABLE DOSE PRE-FILLED PEN SYRINGE | Refills: 11 | OUTPATIENT
Start: 2024-05-20

## 2024-07-10 RX ORDER — SUMATRIPTAN 100 MG/1
TABLET, FILM COATED ORAL
Qty: 9 TABLET | Refills: 11 | Status: SHIPPED | OUTPATIENT
Start: 2024-07-10

## 2024-07-10 RX ORDER — FREMANEZUMAB-VFRM 225 MG/1.5ML
INJECTION SUBCUTANEOUS
Qty: 1 ADJUSTABLE DOSE PRE-FILLED PEN SYRINGE | Refills: 11 | Status: SHIPPED | OUTPATIENT
Start: 2024-07-10

## 2024-08-19 ENCOUNTER — OFFICE VISIT (OUTPATIENT)
Dept: NEUROLOGY | Age: 34
End: 2024-08-19
Payer: COMMERCIAL

## 2024-08-19 DIAGNOSIS — F98.8 ATTENTION DEFICIT DISORDER, UNSPECIFIED HYPERACTIVITY PRESENCE: ICD-10-CM

## 2024-08-19 DIAGNOSIS — G43.709 CHRONIC MIGRAINE WITHOUT AURA WITHOUT STATUS MIGRAINOSUS, NOT INTRACTABLE: Primary | ICD-10-CM

## 2024-08-19 PROCEDURE — 99214 OFFICE O/P EST MOD 30 MIN: CPT | Performed by: PSYCHIATRY & NEUROLOGY

## 2024-08-19 RX ORDER — SUMATRIPTAN 100 MG/1
TABLET, FILM COATED ORAL
Qty: 9 TABLET | Refills: 11 | Status: SHIPPED | OUTPATIENT
Start: 2024-08-19 | End: 2024-08-19

## 2024-08-19 RX ORDER — FREMANEZUMAB-VFRM 225 MG/1.5ML
INJECTION SUBCUTANEOUS
Qty: 3 ADJUSTABLE DOSE PRE-FILLED PEN SYRINGE | Refills: 3 | Status: SHIPPED | OUTPATIENT
Start: 2024-08-19

## 2024-08-19 RX ORDER — SUMATRIPTAN 100 MG/1
100 TABLET, FILM COATED ORAL
Qty: 12 TABLET | Refills: 11 | Status: SHIPPED | OUTPATIENT
Start: 2024-08-19 | End: 2024-08-19

## 2024-08-19 ASSESSMENT — ENCOUNTER SYMPTOMS
RESPIRATORY NEGATIVE: 1
EYES NEGATIVE: 1
GASTROINTESTINAL NEGATIVE: 1
ALLERGIC/IMMUNOLOGIC NEGATIVE: 1

## 2024-10-07 RX ORDER — SUMATRIPTAN 100 MG/1
100 TABLET, FILM COATED ORAL
Qty: 16 TABLET | Refills: 11 | Status: SHIPPED | OUTPATIENT
Start: 2024-10-07 | End: 2024-10-07

## 2024-10-07 RX ORDER — FREMANEZUMAB-VFRM 225 MG/1.5ML
INJECTION SUBCUTANEOUS
Qty: 3 ADJUSTABLE DOSE PRE-FILLED PEN SYRINGE | Refills: 3 | Status: SHIPPED | OUTPATIENT
Start: 2024-10-07

## 2024-12-13 RX ORDER — FREMANEZUMAB-VFRM 225 MG/1.5ML
INJECTION SUBCUTANEOUS
Qty: 3 ADJUSTABLE DOSE PRE-FILLED PEN SYRINGE | Refills: 3 | Status: SHIPPED | OUTPATIENT
Start: 2024-12-13

## 2024-12-13 RX ORDER — SUMATRIPTAN SUCCINATE 100 MG/1
100 TABLET ORAL
Qty: 16 TABLET | Refills: 11 | Status: SHIPPED | OUTPATIENT
Start: 2024-12-13 | End: 2024-12-13

## 2025-01-21 RX ORDER — FREMANEZUMAB-VFRM 225 MG/1.5ML
INJECTION SUBCUTANEOUS
Qty: 3 ADJUSTABLE DOSE PRE-FILLED PEN SYRINGE | Refills: 3 | Status: SHIPPED | OUTPATIENT
Start: 2025-01-21

## 2025-01-21 RX ORDER — SUMATRIPTAN SUCCINATE 100 MG/1
100 TABLET ORAL
Qty: 12 TABLET | Refills: 11 | Status: SHIPPED | OUTPATIENT
Start: 2025-01-21 | End: 2025-01-21

## 2025-02-04 RX ORDER — FREMANEZUMAB-VFRM 225 MG/1.5ML
INJECTION SUBCUTANEOUS
Qty: 3 ADJUSTABLE DOSE PRE-FILLED PEN SYRINGE | Refills: 3 | OUTPATIENT
Start: 2025-02-04

## 2025-02-10 ENCOUNTER — TELEPHONE (OUTPATIENT)
Dept: NEUROLOGY | Age: 35
End: 2025-02-10

## 2025-04-02 RX ORDER — FREMANEZUMAB-VFRM 225 MG/1.5ML
INJECTION SUBCUTANEOUS
Qty: 3 ADJUSTABLE DOSE PRE-FILLED PEN SYRINGE | Refills: 3 | Status: SHIPPED | OUTPATIENT
Start: 2025-04-02

## 2025-04-02 RX ORDER — SUMATRIPTAN SUCCINATE 100 MG/1
100 TABLET ORAL
Qty: 12 TABLET | Refills: 11 | Status: SHIPPED | OUTPATIENT
Start: 2025-04-02

## 2025-04-28 RX ORDER — FREMANEZUMAB-VFRM 225 MG/1.5ML
INJECTION SUBCUTANEOUS
Qty: 3 ADJUSTABLE DOSE PRE-FILLED PEN SYRINGE | Refills: 3 | Status: SHIPPED | OUTPATIENT
Start: 2025-04-28

## 2025-04-28 RX ORDER — SUMATRIPTAN SUCCINATE 100 MG/1
100 TABLET ORAL
Qty: 12 TABLET | Refills: 11 | Status: SHIPPED | OUTPATIENT
Start: 2025-04-28

## 2025-06-12 RX ORDER — SUMATRIPTAN SUCCINATE 100 MG/1
100 TABLET ORAL
Qty: 12 TABLET | Refills: 11 | Status: SHIPPED | OUTPATIENT
Start: 2025-06-12

## 2025-06-12 RX ORDER — FREMANEZUMAB-VFRM 225 MG/1.5ML
INJECTION SUBCUTANEOUS
Qty: 3 ADJUSTABLE DOSE PRE-FILLED PEN SYRINGE | Refills: 3 | Status: SHIPPED | OUTPATIENT
Start: 2025-06-12

## 2025-07-14 RX ORDER — SUMATRIPTAN SUCCINATE 100 MG/1
100 TABLET ORAL
Qty: 12 TABLET | Refills: 11 | Status: SHIPPED | OUTPATIENT
Start: 2025-07-14

## 2025-07-14 RX ORDER — FREMANEZUMAB-VFRM 225 MG/1.5ML
INJECTION SUBCUTANEOUS
Qty: 3 ADJUSTABLE DOSE PRE-FILLED PEN SYRINGE | Refills: 3 | Status: SHIPPED | OUTPATIENT
Start: 2025-07-14